# Patient Record
Sex: FEMALE | Race: WHITE | Employment: STUDENT | URBAN - METROPOLITAN AREA
[De-identification: names, ages, dates, MRNs, and addresses within clinical notes are randomized per-mention and may not be internally consistent; named-entity substitution may affect disease eponyms.]

---
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
## 2025-07-11 ENCOUNTER — OFFICE VISIT (OUTPATIENT)
Age: 20
End: 2025-07-11

## 2025-07-11 VITALS
HEIGHT: 63 IN | TEMPERATURE: 98.5 F | SYSTOLIC BLOOD PRESSURE: 95 MMHG | HEART RATE: 72 BPM | OXYGEN SATURATION: 100 % | BODY MASS INDEX: 17.72 KG/M2 | WEIGHT: 100 LBS | DIASTOLIC BLOOD PRESSURE: 61 MMHG

## 2025-07-11 DIAGNOSIS — R11.2 NAUSEA AND VOMITING, UNSPECIFIED VOMITING TYPE: ICD-10-CM

## 2025-07-11 DIAGNOSIS — B27.90 INFECTIOUS MONONUCLEOSIS WITHOUT COMPLICATION, INFECTIOUS MONONUCLEOSIS DUE TO UNSPECIFIED ORGANISM: Primary | ICD-10-CM

## 2025-07-11 DIAGNOSIS — J02.9 SORE THROAT: ICD-10-CM

## 2025-07-11 LAB
Lab: NORMAL
MONONUCLEOSIS SCREEN POC: POSITIVE
PERFORMING INSTRUMENT: NORMAL
QC PASS/FAIL: NORMAL
S PYO AG THROAT QL: NORMAL
SARS-COV-2, POC: NORMAL
VALID INTERNAL CONTROL: ABNORMAL

## 2025-07-11 RX ORDER — ONDANSETRON 4 MG/1
4 TABLET, ORALLY DISINTEGRATING ORAL 3 TIMES DAILY PRN
Qty: 15 TABLET | Refills: 0 | Status: SHIPPED | OUTPATIENT
Start: 2025-07-11 | End: 2025-07-16

## 2025-07-11 RX ORDER — ONDANSETRON 4 MG/1
4 TABLET, ORALLY DISINTEGRATING ORAL ONCE
Status: COMPLETED | OUTPATIENT
Start: 2025-07-11 | End: 2025-07-11

## 2025-07-11 RX ADMIN — ONDANSETRON 4 MG: 4 TABLET, ORALLY DISINTEGRATING ORAL at 13:34

## 2025-07-11 NOTE — PATIENT INSTRUCTIONS
For viral Pharyngitis antibiotics are not needed and will not help symptoms because it is not a bacterial   Gargle with warm salt water several times a day to help reduce swelling and relieve pain. Mix ½ teaspoon (2.5 mL) of salt in 1 cup (250 mL) of warm water.  Take an over-the-counter pain medicine, such as acetaminophen (Tylenol), ibuprofen (Advil, Motrin). Read and follow all instructions on the label.  Be careful when taking over-the-counter cold or influenza (flu) medicines and Tylenol at the same time. Many of these medicines have acetaminophen, which is Tylenol. Read the labels to make sure that you are not taking more than the recommended dose. Too much acetaminophen (Tylenol) can be harmful.  Drink plenty of fluids. Fluids may help soothe an irritated throat. Hot fluids, such as tea or soup, may help decrease throat pain.  Use over-the-counter throat lozenges to soothe pain. Regular cough drops or hard candy may also help. These should not be given to young children because of the risk of choking.  Do not smoke or allow others to smoke around you. If you need help quitting, talk to your doctor about stop-smoking programs and medicines. These can increase your chances of quitting for good.  Use a vaporizer or humidifier to add moisture to your bedroom. Follow the directions for cleaning the machine.  Throw away toothbrush 48 hours after starting antibiotic and replace  Go to ER if high persistent fever, trouble tolerating own spit, dehydration, difficulty swallowing or breathing or any concerning symptoms.    Read information on mono as provided and follow up as needed with PCP

## 2025-07-11 NOTE — PROGRESS NOTES
Patient Name: Elvira Arreaga   YOB: 2005   Patient Status: New patient,   Chief Complaint: No chief complaint on file.      ____________________________________________________________________________________________    ASSESSMENT/PLAN:    Clinical Impression:   Assessment & Plan  Infectious mononucleosis without complication, infectious mononucleosis due to unspecified organism        Nausea and vomiting, unspecified vomiting type       Orders:    ondansetron (ZOFRAN-ODT) disintegrating tablet 4 mg    POC HETEROPHILE ANTIBODY SCREEN    ondansetron (ZOFRAN-ODT) 4 MG disintegrating tablet; Take 1 tablet by mouth 3 times daily as needed for Nausea or Vomiting    Sore throat       Orders:    POCT rapid strep A    POCT COVID-19, Antigen    POC HETEROPHILE ANTIBODY SCREEN    Strep A culture, throat         MDM: The patient presents with symptoms and clinic findings consistent with mononucleosis . At the time of discharge the patient clinically looked well.  Covid is negative,  Strep is negative.  Mom is concerned that she has strep and it could be missed. Strep culture sent. The patient had normal work of breathing. Normal level of alertness.  Well hydrated. No meningeal signs. Lungs CTA. Normal room air O2sat. No signs of sepsis.  ER precautions given to include persistent high fever, shortness of breath or labored breathing, chest pain or new / worsening symptoms. Symptoms care instructions given and discussed.  All questions answered.  Encouraged follow up with PCP for persistent or recurrent symptoms. Patient verbalized understanding and agreement with precautions and care plan.        External Records Reviewed: None    Limitation to History: None    Outside Historian: None    SUBJECTIVE/OBJECTIVE:  Elvira Arreaga is a 20 y.o. female presents with complaint of sore throat, headache, chills, and vomiting.  Symptoms began 1 day(s) ago and are worsening since onset.  Patient had episode

## 2025-07-15 LAB — S PYO THROAT QL CULT: NEGATIVE

## (undated) DEVICE — STRAP,POSITIONING,KNEE/BODY,FOAM,4X60": Brand: MEDLINE

## (undated) DEVICE — SINGLE-USE BIOPSY FORCEPS: Brand: RADIAL JAW 4

## (undated) DEVICE — COLON KIT WITH 1.1 OZ ORCA HYDRA SEAL 2 GOWN